# Patient Record
Sex: MALE | Race: WHITE | NOT HISPANIC OR LATINO | Employment: OTHER | URBAN - NONMETROPOLITAN AREA
[De-identification: names, ages, dates, MRNs, and addresses within clinical notes are randomized per-mention and may not be internally consistent; named-entity substitution may affect disease eponyms.]

---

## 2024-11-11 ENCOUNTER — HOSPITAL ENCOUNTER (EMERGENCY)
Facility: HOSPITAL | Age: 34
Discharge: HOME | End: 2024-11-11
Attending: EMERGENCY MEDICINE
Payer: MEDICARE

## 2024-11-11 ENCOUNTER — APPOINTMENT (OUTPATIENT)
Dept: RADIOLOGY | Facility: HOSPITAL | Age: 34
End: 2024-11-11
Payer: MEDICARE

## 2024-11-11 VITALS
WEIGHT: 150 LBS | SYSTOLIC BLOOD PRESSURE: 103 MMHG | HEART RATE: 62 BPM | TEMPERATURE: 98.4 F | HEIGHT: 70 IN | OXYGEN SATURATION: 97 % | BODY MASS INDEX: 21.47 KG/M2 | DIASTOLIC BLOOD PRESSURE: 57 MMHG | RESPIRATION RATE: 16 BRPM

## 2024-11-11 DIAGNOSIS — R10.11 RIGHT UPPER QUADRANT ABDOMINAL PAIN: Primary | ICD-10-CM

## 2024-11-11 DIAGNOSIS — R11.10 VOMITING, UNSPECIFIED VOMITING TYPE, UNSPECIFIED WHETHER NAUSEA PRESENT: ICD-10-CM

## 2024-11-11 LAB
ALBUMIN SERPL BCP-MCNC: 3.7 G/DL (ref 3.4–5)
ALP SERPL-CCNC: 21 U/L (ref 33–120)
ALT SERPL W P-5'-P-CCNC: 9 U/L (ref 10–52)
ANION GAP SERPL CALC-SCNC: 12 MMOL/L (ref 10–20)
AST SERPL W P-5'-P-CCNC: 15 U/L (ref 9–39)
BASOPHILS # BLD AUTO: 0.02 X10*3/UL (ref 0–0.1)
BASOPHILS NFR BLD AUTO: 0.5 %
BILIRUB SERPL-MCNC: 0.7 MG/DL (ref 0–1.2)
BUN SERPL-MCNC: 13 MG/DL (ref 6–23)
CALCIUM SERPL-MCNC: 8.5 MG/DL (ref 8.6–10.3)
CHLORIDE SERPL-SCNC: 106 MMOL/L (ref 98–107)
CO2 SERPL-SCNC: 25 MMOL/L (ref 21–32)
CREAT SERPL-MCNC: 0.82 MG/DL (ref 0.5–1.3)
EGFRCR SERPLBLD CKD-EPI 2021: >90 ML/MIN/1.73M*2
EOSINOPHIL # BLD AUTO: 0.04 X10*3/UL (ref 0–0.7)
EOSINOPHIL NFR BLD AUTO: 0.9 %
ERYTHROCYTE [DISTWIDTH] IN BLOOD BY AUTOMATED COUNT: 12.2 % (ref 11.5–14.5)
GLUCOSE SERPL-MCNC: 92 MG/DL (ref 74–99)
HCT VFR BLD AUTO: 39.6 % (ref 41–52)
HGB BLD-MCNC: 13.4 G/DL (ref 13.5–17.5)
HOLD SPECIMEN: NORMAL
IMM GRANULOCYTES # BLD AUTO: 0.01 X10*3/UL (ref 0–0.7)
IMM GRANULOCYTES NFR BLD AUTO: 0.2 % (ref 0–0.9)
LACTATE SERPL-SCNC: 1.4 MMOL/L (ref 0.4–2)
LIPASE SERPL-CCNC: 19 U/L (ref 9–82)
LYMPHOCYTES # BLD AUTO: 1.88 X10*3/UL (ref 1.2–4.8)
LYMPHOCYTES NFR BLD AUTO: 44.3 %
MCH RBC QN AUTO: 30.9 PG (ref 26–34)
MCHC RBC AUTO-ENTMCNC: 33.8 G/DL (ref 32–36)
MCV RBC AUTO: 91 FL (ref 80–100)
MONOCYTES # BLD AUTO: 0.58 X10*3/UL (ref 0.1–1)
MONOCYTES NFR BLD AUTO: 13.7 %
NEUTROPHILS # BLD AUTO: 1.71 X10*3/UL (ref 1.2–7.7)
NEUTROPHILS NFR BLD AUTO: 40.4 %
NRBC BLD-RTO: 0 /100 WBCS (ref 0–0)
PLATELET # BLD AUTO: 107 X10*3/UL (ref 150–450)
POTASSIUM SERPL-SCNC: 3.7 MMOL/L (ref 3.5–5.3)
PROT SERPL-MCNC: 6 G/DL (ref 6.4–8.2)
RBC # BLD AUTO: 4.34 X10*6/UL (ref 4.5–5.9)
SODIUM SERPL-SCNC: 139 MMOL/L (ref 136–145)
WBC # BLD AUTO: 4.2 X10*3/UL (ref 4.4–11.3)

## 2024-11-11 PROCEDURE — 74177 CT ABD & PELVIS W/CONTRAST: CPT

## 2024-11-11 PROCEDURE — 80053 COMPREHEN METABOLIC PANEL: CPT | Performed by: EMERGENCY MEDICINE

## 2024-11-11 PROCEDURE — 85025 COMPLETE CBC W/AUTO DIFF WBC: CPT | Performed by: EMERGENCY MEDICINE

## 2024-11-11 PROCEDURE — 83690 ASSAY OF LIPASE: CPT | Performed by: EMERGENCY MEDICINE

## 2024-11-11 PROCEDURE — 2550000001 HC RX 255 CONTRASTS: Mod: SE | Performed by: EMERGENCY MEDICINE

## 2024-11-11 PROCEDURE — 36415 COLL VENOUS BLD VENIPUNCTURE: CPT | Performed by: EMERGENCY MEDICINE

## 2024-11-11 PROCEDURE — 83605 ASSAY OF LACTIC ACID: CPT | Performed by: EMERGENCY MEDICINE

## 2024-11-11 PROCEDURE — 99284 EMERGENCY DEPT VISIT MOD MDM: CPT | Mod: 25

## 2024-11-11 PROCEDURE — 74177 CT ABD & PELVIS W/CONTRAST: CPT | Performed by: RADIOLOGY

## 2024-11-11 RX ORDER — DIVALPROEX SODIUM 500 MG/1
500 TABLET, DELAYED RELEASE ORAL 3 TIMES DAILY
COMMUNITY

## 2024-11-11 RX ORDER — DEXTROMETHORPHAN HYDROBROMIDE, GUAIFENESIN 5; 100 MG/5ML; MG/5ML
650 LIQUID ORAL EVERY 8 HOURS PRN
COMMUNITY

## 2024-11-11 RX ORDER — CETIRIZINE HYDROCHLORIDE 5 MG/1
10 TABLET ORAL DAILY
COMMUNITY

## 2024-11-11 RX ORDER — ARIPIPRAZOLE 2 MG/1
6 TABLET ORAL DAILY
COMMUNITY

## 2024-11-11 ASSESSMENT — PAIN DESCRIPTION - DESCRIPTORS: DESCRIPTORS: ACHING

## 2024-11-11 ASSESSMENT — PAIN DESCRIPTION - FREQUENCY: FREQUENCY: CONSTANT/CONTINUOUS

## 2024-11-11 ASSESSMENT — PAIN DESCRIPTION - ORIENTATION: ORIENTATION: RIGHT;UPPER

## 2024-11-11 ASSESSMENT — COLUMBIA-SUICIDE SEVERITY RATING SCALE - C-SSRS
6. HAVE YOU EVER DONE ANYTHING, STARTED TO DO ANYTHING, OR PREPARED TO DO ANYTHING TO END YOUR LIFE?: NO
1. IN THE PAST MONTH, HAVE YOU WISHED YOU WERE DEAD OR WISHED YOU COULD GO TO SLEEP AND NOT WAKE UP?: NO
2. HAVE YOU ACTUALLY HAD ANY THOUGHTS OF KILLING YOURSELF?: NO

## 2024-11-11 ASSESSMENT — PAIN - FUNCTIONAL ASSESSMENT: PAIN_FUNCTIONAL_ASSESSMENT: WONG-BAKER FACES

## 2024-11-11 ASSESSMENT — PAIN SCALES - WONG BAKER: WONGBAKER_NUMERICALRESPONSE: HURTS LITTLE BIT

## 2024-11-11 ASSESSMENT — PAIN DESCRIPTION - PAIN TYPE: TYPE: ACUTE PAIN

## 2024-11-11 ASSESSMENT — PAIN DESCRIPTION - LOCATION: LOCATION: ABDOMEN

## 2024-11-11 NOTE — ED PROVIDER NOTES
HPI   Chief Complaint   Patient presents with    Abdominal Pain     Pt from [facility] with right upper quad abdominal pain, emesis x1 yesterday. Pt nonverbal d/t autism.        Patient is a nonverbal client from the [facility].  He was sent in because of an episode of vomiting yesterday and apparently was thought to have abdominal pain on and off for the last day.  During my evaluation, he does indicate that his pain is in the right upper quadrant.  He does not seem to be painful when I press but whenever I press in other quadrants, he returns his hand to the right upper) to indicate that that is indeed still where he is having pain.            Patient History   Past Medical History:   Diagnosis Date    ADHD     Autism (HHS-HCC)     Esophagitis     Impulse control disorder     Major depression with psychotic features (Multi)     Schizoaffective disorder (Multi)     Severe intellectual disability      History reviewed. No pertinent surgical history.  No family history on file.  Social History     Tobacco Use    Smoking status: Never    Smokeless tobacco: Never   Vaping Use    Vaping status: Never Used   Substance Use Topics    Alcohol use: Never    Drug use: Never       Physical Exam   ED Triage Vitals [11/11/24 0619]   Temperature Heart Rate Respirations BP   36.9 °C (98.4 °F) 72 16 112/84      Pulse Ox Temp Source Heart Rate Source Patient Position   95 % Oral Monitor Sitting      BP Location FiO2 (%)     Right arm --       Physical Exam  Vitals and nursing note reviewed.   Constitutional:       General: He is not in acute distress.     Appearance: He is well-developed.   HENT:      Head: Normocephalic and atraumatic.   Eyes:      Conjunctiva/sclera: Conjunctivae normal.   Cardiovascular:      Rate and Rhythm: Normal rate and regular rhythm.      Heart sounds: No murmur heard.  Pulmonary:      Effort: Pulmonary effort is normal. No respiratory distress.      Breath sounds: Normal breath sounds.   Abdominal:       Palpations: Abdomen is soft.      Tenderness: There is abdominal tenderness in the right upper quadrant.   Musculoskeletal:         General: No swelling.      Cervical back: Neck supple.   Skin:     General: Skin is warm and dry.      Capillary Refill: Capillary refill takes less than 2 seconds.   Neurological:      Mental Status: He is alert.      Comments: Nonverbal   Psychiatric:         Mood and Affect: Mood normal.           ED Course & MDM   Diagnoses as of 11/16/24 0244   Right upper quadrant abdominal pain   Vomiting, unspecified vomiting type, unspecified whether nausea present                 No data recorded     [place] Coma Scale Score: 11 (11/11/24 1159 : Lennie Gonsales RN)                           Medical Decision Making  The patient's vital signs appear stable and he does not appear toxic, but with him having had an episode of emesis and continuing to have pain in his abdomen, we will check blood work as well as a CT abdomen pelvis to rule out a surgical problem.    Patient's lab work was all normal.  CT of abdomen and pelvis were done and these showed the presence of bowel changes consistent with enteritis but no surgical pathology.  Patient will be stable for discharge with outpatient follow-up.  He will need to return to the emergency department should anything change about his condition.        Procedure  Procedures     Paulo Katz MD  11/11/24 0649       Paulo Katz MD  11/16/24 2938

## 2024-11-11 NOTE — ED NOTES
Pt presents to the ED from [facility] with complaints of right upper quad abdominal pain, vomited x 1 at home yesterday. Pt nonverbal.     Rafaela Egan RN  11/11/24 3931

## 2024-11-11 NOTE — PROGRESS NOTES
Emergency Medicine Transition of Care Note.    I received Simeon Reeder in signout from Dr. Katz.  Please see the previous ED provider note for all HPI, PE and MDM up to the time of signout at 0700. This is in addition to the primary record.    In brief Simeon Reeder is an 34 y.o. male presenting for abdominal pain with 1 episode of vomiting  Chief Complaint   Patient presents with    Abdominal Pain     Pt from [place] home with right upper quad abdominal pain, emesis x1 yesterday. Pt nonverbal d/t autism.      At the time of signout we were awaiting: labwork and imaging results    Diagnoses as of 11/11/24 0917   Right upper quadrant abdominal pain   Vomiting, unspecified vomiting type, unspecified whether nausea present       Medical Decision Making  Patient is a 34-year-old male sent to the ED today from his facility for abdominal pain with 1 episode of vomiting over the past 24 hours.  Patient is nonverbal at baseline.  Patient was initially seen by the previous physician.  He was signed out to me pending the results of his workup and further disposition.    CBC is unremarkable, with no leukocytosis.  CMP is unremarkable.  Lipase is normal at 19.  Lactic acid is normal at 1.4.  CT abdomen pelvis w IV contrast   Final Result   Small amount of nonspecific fluid posteriorly/superiorly to the   bladder, possibly related to enteritis, however no abscess or free   air seen. Correlate clinically and follow-up as needed.        Signed by: Vincent Cox 11/11/2024 8:44 AM   Dictation workstation:   [id number]        At time of my evaluation, patient is nontoxic-appearing, resting comfortably in bed.  He has no focal areas of abdominal tenderness to palpation on my exam.  Patient is discharged back to his facility in stable condition.      Final diagnoses:   [R10.11] Right upper quadrant abdominal pain   [R11.10] Vomiting, unspecified vomiting type, unspecified whether nausea present        Procedure  Procedures    Cta Lynne MD